# Patient Record
(demographics unavailable — no encounter records)

---

## 2024-11-15 NOTE — HISTORY OF PRESENT ILLNESS
[FreeTextEntry1] : 45 yo man with a history of HIV/AIDs with immune recovery feeling well on Biktarvy  He has been struggling with diet weight loss, hyperlipidemia,mildly elevated LFTs    His BP was elevated at prior visits, but has been improving with life style modifications and working form home during the pandemic.  He has a PMD and will follow up for medication adjustments  Concerned also about diet and weight gain during COVID and working from home- offered nutritionist referral   Denies side effects from ARV meds. Reports adherence with his Biktarvy  No intercurrent illnesses or hospitalizations  Working from home () Declines monkey pox vaccine, monogamous with partner Declines swabbing x3 monogomous,   RTC 4-5 mo

## 2024-11-15 NOTE — ASSESSMENT
[Treatment Education] : treatment education [Treatment Adherence] : treatment adherence [Rx Dose / Side Effects] : Rx dose/side effects [Risk Reduction] : risk reduction [Nutritional / Food Issues] : nutritional/food issues [Universal Precautions] : universal precautions [Medical Care Issues] : medical care issues [Drug Interactions / Side Effects] : drug interactions/side effects [Disclosure of Diagnosis] : disclosure of diagnosis [HIV Education] : HIV Education [Anticipatory Guidance] : anticipatory guidance [Education Materials Provided] : Eduction materials were provided [FreeTextEntry1] : 45 yo man with history of HIV stable on current regimen with two new cats in the house and significant asthma sxs. increased with lying down at night have improved reports adherence with ARV meds and no new issues identified  HIV check Tcells check VL CBC CMP   HM: hyperlipidemia HTN repeat labs discussed options if elevated- has a PCP   RTC 4-5 mo

## 2025-04-11 NOTE — ASSESSMENT
[Treatment Education] : treatment education [Treatment Adherence] : treatment adherence [Anticipatory Guidance] : anticipatory guidance [Education Materials Provided] : Eduction materials were provided [FreeTextEntry1] : 45 yo man with history of HIV stable on current regimen with two new cats in the house and significant asthma sxs. increased with lying down at night have improved reports adherence with ARV meds and no new issues identified  HIV check Tcells check VL CBC CMP   HM: hyperlipidemia repeat labs discussed options if elevated- has a PCP   RTC 4-5 mo [Universal Precautions] : universal precautions [Medical Care Issues] : medical care issues

## 2025-04-11 NOTE — ASSESSMENT
[Treatment Education] : treatment education [Treatment Adherence] : treatment adherence [Anticipatory Guidance] : anticipatory guidance [Education Materials Provided] : Eduction materials were provided [FreeTextEntry1] : 47 yo man with history of HIV stable on current regimen with two new cats in the house and significant asthma sxs. increased with lying down at night have improved reports adherence with ARV meds and no new issues identified  HIV check Tcells check VL CBC CMP   HM: hyperlipidemia repeat labs discussed options if elevated- has a PCP   RTC 4-5 mo [Universal Precautions] : universal precautions [Medical Care Issues] : medical care issues

## 2025-04-11 NOTE — PHYSICAL EXAM
[General Appearance - Alert] : alert [General Appearance - In No Acute Distress] : in no acute distress [Sclera] : the sclera and conjunctiva were normal [PERRL With Normal Accommodation] : pupils were equal in size, round, reactive to light [Extraocular Movements] : extraocular movements were intact [Outer Ear] : the ears and nose were normal in appearance [Oropharynx] : the oropharynx was normal with no thrush [Auscultation Breath Sounds / Voice Sounds] : lungs were clear to auscultation bilaterally [Heart Rate And Rhythm] : heart rate was normal and rhythm regular [Heart Sounds] : normal S1 and S2 [Heart Sounds Gallop] : no gallops [Murmurs] : no murmurs [Heart Sounds Pericardial Friction Rub] : no pericardial rub [Full Pulse] : the pedal pulses are present [Edema] : there was no peripheral edema [Bowel Sounds] : normal bowel sounds [Abdomen Soft] : soft [Abdomen Tenderness] : non-tender [Abdomen Mass (___ Cm)] : no abdominal mass palpated [Costovertebral Angle Tenderness] : no CVA tenderness [No Palpable Adenopathy] : no palpable adenopathy [Musculoskeletal - Swelling] : no joint swelling [Nail Clubbing] : no clubbing  or cyanosis of the fingernails [Motor Tone] : muscle strength and tone were normal [Skin Color & Pigmentation] : normal skin color and pigmentation [] : no rash [Deep Tendon Reflexes (DTR)] : deep tendon reflexes were 2+ and symmetric [Sensation] : the sensory exam was normal to light touch and pinprick [No Focal Deficits] : no focal deficits [Oriented To Time, Place, And Person] : oriented to person, place, and time [Affect] : the affect was normal [Neck Appearance] : the appearance of the neck was normal [Neck Cervical Mass (___cm)] : no neck mass was observed [Jugular Venous Distention Increased] : there was no jugular-venous distention [Thyroid Diffuse Enlargement] : the thyroid was not enlarged [Over the Past 2 Weeks, Have You Felt Down, Depressed, or Hopeless?] : 1.) Over the past 2 weeks, have you felt down, depressed, or hopeless? No [Over the Past 2 Weeks, Have You Felt Little Interest or Pleasure Doing Things?] : 2.) Over the past 2 weeks, have you felt little interest or pleasure doing things? No [FreeTextEntry1] : no sxs of depression reported

## 2025-04-11 NOTE — HISTORY OF PRESENT ILLNESS
[FreeTextEntry1] : 47 yo man with a history of HIV/AIDs with immune recovery feeling well on Biktarvy  He has been struggling with diet weight loss, hyperlipidemia,mildly elevated LFTs    His BP was elevated at prior visits, but has been improving with life style modifications and working form home during the pandemic.  He has a PMD and will follow up for medication adjustments  Concerned also about diet and weight gain during COVID and working from home- offered nutritionist referral   Denies side effects from ARV meds. Reports adherence with his Biktarvy  No intercurrent illnesses or hospitalizations  Working from home () Declines monkey pox vaccine, monogamous with partner Declines swabbing x3 monogomous,   RTC 4-5 mo